# Patient Record
(demographics unavailable — no encounter records)

---

## 2025-04-09 NOTE — ASSESSMENT
[FreeTextEntry1] : -- Pt coming in for eval of dark spots on the face, some present x many years, some flatter/broader lesions noted just in past 2-3 years. pt works outdoors. No personal or fhx of skinCA Also notes dark patch in L axilla following reaction to a deodorant. no longer itchy  #Solar lentigines - few lesions L cheek of pt concern today with benign dermoscopy Benign nature discussed. Reassurance. No tx required. Pt declines cosmetic referral Importance of sun protection discussed to prevent risk of skinCA as well as photoaging multimodal sun protection measures discussed  #benign nevi on the face -benign dermosocpy, stable x many years per pt. reassurance  #Irritant dermatitis -L axilla, mostly PIH today -rec limited course HC lotion - BID x 2 weeks then stop. SED. -difficult nature of PIH discussed - benign condition but fades very slowly over time

## 2025-04-09 NOTE — HISTORY OF PRESENT ILLNESS
[FreeTextEntry1] : dark spots on the face [de-identified] : Pt coming in for eval of dark spots on the face, some present x many years, some flatter/broader lesions noted just in past 2-3 years. pt works outdoors. No personal or fhx of skinCA Also notes dark patch in L axilla following reaction to a deodorant. no longer itchy

## 2025-04-09 NOTE — PHYSICAL EXAM
[FreeTextEntry3] :  Gen:                        Well appearing, well nourished, NAD. Skin:                       Eccrine:                 Within normal limits               Face:                             UE:                        Neuro:                     No focal deficits. Age appropriate. Psych:                     Appropriate affect.